# Patient Record
(demographics unavailable — no encounter records)

---

## 2025-03-26 NOTE — PHYSICAL EXAM
[No Acute Distress] : no acute distress [No Joint Swelling] : no joint swelling [de-identified] : half of nail plate missing on right 4th fingernail

## 2025-03-26 NOTE — HISTORY OF PRESENT ILLNESS
[FreeTextEntry8] : 30M with autism presents for months of right 4th fingernail separation. Family states he bites his fingernails but he denies he is biting his right 4th fingernail. Denies rash, discharge, or pain.

## 2025-04-02 NOTE — PHYSICAL EXAM
[de-identified] : - Constitutional: This is a male in no obvious distress. He is accompanied by his parents.   ---  Examination of his right ring finger demonstrates evidence of a shortened nail with nonadherence of the distal one third of the nail.  There are no signs of infection or paronychia.  There is dry skin of the nailbed distally but no erythema.  He is neurovascularly intact distally.

## 2025-04-02 NOTE — CONSULT LETTER
[Dear  ___] : Dear  [unfilled], [Consult Letter:] : I had the pleasure of evaluating your patient, [unfilled]. [Please see my note below.] : Please see my note below. [Consult Closing:] : Thank you very much for allowing me to participate in the care of this patient.  If you have any questions, please do not hesitate to contact me. [Sincerely,] : Sincerely, [FreeTextEntry3] : Angel Mancini M.D. Surgery of the Hand & Upper Extremity Orthopaedic Surgery Chief, Hand Service, [employer] Director, Hand  of Orthopedic Surgery, [facility] [employer] at [facility] HospitalEmail: [email or web address] Office Phone: [phone]

## 2025-04-02 NOTE — END OF VISIT
[FreeTextEntry3] : This note was written by Feliciano Peter on 04/02/2025 acting solely as a scribe for Dr. Angel Mancini.   All medical record entries made by the Scribe were at my, Dr. Angel Mancini, direction and personally dictated by me on 04/02/2025. I have personally reviewed the chart and agree that the record accurately reflects my personal performance of the history, physical exam, assessment and plan.

## 2025-04-02 NOTE — HISTORY OF PRESENT ILLNESS
[Right] : right hand dominant [FreeTextEntry1] : He comes in today for evaluation of right ring fingernail. He denies any numbness/tingling.   He has a medical history of autism.   He is accompanied by his parents.  He was referred by Dr. Dubois.

## 2025-04-02 NOTE — DISCUSSION/SUMMARY
[FreeTextEntry1] : He has findings consistent with a chronic right ring finger nail deformity with lifting off of the distal nail for the past year.  This may represent a chronic fungal infection with nonadherence or possibly not adherent secondary to scarring along the nailbed, as he does bite his nails   I had a discussion with the patient and his parents regarding today's visit, the prognosis of this diagnosis, and treatment recommendations and options. At this time, I recommended observation, as he denies pain.  I recommended he avoid biting his nail. If his symptoms do bother him in the future, I discussed the option of removing his nail in the office. He will follow up as needed.     The patient has agreed to the above plan of management and has expressed full understanding. All questions were fully answered to the patient's satisfaction.   My cumulative time spent on this visit included: Preparation for the visit, review of the medical records, review of pertinent diagnostic studies, examination and counseling of the patient on the above diagnosis, treatment plan and prognosis, orders of diagnostic tests, medication and/or appropriate procedures and documentation in the medical records of today's visit.

## 2025-04-02 NOTE — ADDENDUM
[FreeTextEntry1] :  I, Feliciano Peter, acted solely as a scribe for Dr. Mancini on this date on 04/02/2025.

## 2025-07-28 NOTE — HEALTH RISK ASSESSMENT
[Good] : ~his/her~  mood as  good [No] : In the past 12 months have you used drugs other than those required for medical reasons? No [0] : 2) Feeling down, depressed, or hopeless: Not at all (0) [PHQ-2 Negative - No further assessment needed] : PHQ-2 Negative - No further assessment needed [[id number]] : 0 [Change in mental status noted] : No change in mental status noted

## 2025-07-28 NOTE — ASSESSMENT
[FreeTextEntry1] : HCM: Check labs. Will discuss results.   [Vaccines Reviewed] : Immunizations reviewed today. Please see immunization details in the vaccine log within the immunization flowsheet.